# Patient Record
Sex: FEMALE | Race: BLACK OR AFRICAN AMERICAN | URBAN - METROPOLITAN AREA
[De-identification: names, ages, dates, MRNs, and addresses within clinical notes are randomized per-mention and may not be internally consistent; named-entity substitution may affect disease eponyms.]

---

## 2022-10-17 PROBLEM — R87.619 ABNORMAL PAP SMEAR OF CERVIX: Status: ACTIVE | Noted: 2021-05-01

## 2022-11-02 ENCOUNTER — OFFICE VISIT (OUTPATIENT)
Dept: OBGYN CLINIC | Age: 32
End: 2022-11-02
Payer: SUBSIDIZED

## 2022-11-02 ENCOUNTER — HOSPITAL ENCOUNTER (OUTPATIENT)
Dept: LAB | Age: 32
Discharge: HOME | End: 2022-11-02

## 2022-11-02 VITALS
HEIGHT: 66 IN | BODY MASS INDEX: 45.16 KG/M2 | SYSTOLIC BLOOD PRESSURE: 130 MMHG | WEIGHT: 281 LBS | DIASTOLIC BLOOD PRESSURE: 82 MMHG

## 2022-11-02 DIAGNOSIS — R87.610 ATYPICAL SQUAMOUS CELLS OF UNDETERMINED SIGNIFICANCE (ASCUS) ON PAPANICOLAOU SMEAR OF CERVIX: Primary | ICD-10-CM

## 2022-11-02 LAB
HCG URINE, QL. (POC): NEGATIVE
VALID INTERNAL CONTROL?: YES

## 2022-11-02 PROCEDURE — 81025 URINE PREGNANCY TEST: CPT | Performed by: OBSTETRICS & GYNECOLOGY

## 2022-11-02 PROCEDURE — 57454 BX/CURETT OF CERVIX W/SCOPE: CPT | Performed by: OBSTETRICS & GYNECOLOGY

## 2022-11-02 PROCEDURE — 99203 OFFICE O/P NEW LOW 30 MIN: CPT | Performed by: OBSTETRICS & GYNECOLOGY

## 2022-11-02 NOTE — PROGRESS NOTES
Abnormal Pap Problem Visit    Evan Perez is a 34 y.o.  presenting for problem visit for discussion and management of an abnormal pap test.  Patient had an LGSIL pap in 2021 and then had an ASCUS HPV negative pap 2022. She was referred today for evaluation given history of multiple abnormal paps. She reports no prior colposcopies or procedures on her cervix. She has not abnormal bleeding. Ob/Gyn Hx:    - 2 post dates c-sections  LMP- 10/22/2022  Menses- regular  Contraception-none  SA- yes- female partner.       Past Medical History:   Diagnosis Date    Abnormal Pap smear of cervix 2021    LSIL    Abnormal Pap smear of cervix 2022    ASCUS, HPV neg    Low iron        Past Surgical History:   Procedure Laterality Date    HX  SECTION      x2    HX CHOLECYSTECTOMY         Family History   Problem Relation Age of Onset    Hypertension Mother     Diabetes Mother        Social History     Socioeconomic History    Marital status: SINGLE     Spouse name: Not on file    Number of children: Not on file    Years of education: Not on file    Highest education level: Not on file   Occupational History    Not on file   Tobacco Use    Smoking status: Never    Smokeless tobacco: Never   Vaping Use    Vaping Use: Never used   Substance and Sexual Activity    Alcohol use: Yes    Drug use: Yes     Types: Marijuana    Sexual activity: Yes     Partners: Female   Other Topics Concern    Not on file   Social History Narrative    Not on file     Social Determinants of Health     Financial Resource Strain: Not on file   Food Insecurity: Not on file   Transportation Needs: Not on file   Physical Activity: Not on file   Stress: Not on file   Social Connections: Not on file   Intimate Partner Violence: Not on file   Housing Stability: Not on file       Not on File    Review of Systems - History obtained from the patient  Constitutional: negative for weight loss, fever, night sweats  HEENT: negative for hearing loss, earache, congestion, snoring, sorethroat  CV: negative for chest pain, palpitations, edema  Resp: negative for cough, shortness of breath, wheezing  GI: negative for change in bowel habits, abdominal pain, black or bloody stools  : negative for frequency, dysuria, hematuria, vaginal discharge  MSK: negative for back pain, joint pain, muscle pain  Breast: negative for breast lumps, nipple discharge, galactorrhea  Skin :negative for itching, rash, hives  Neuro: negative for dizziness, headache, confusion, weakness  Psych: negative for anxiety, depression, change in mood  Heme/lymph: negative for bleeding, bruising, pallor    Physical Exam    Visit Vitals  /82 (BP 1 Location: Right arm, BP Patient Position: Sitting)   Ht 5' 6\" (1.676 m)   Wt 281 lb (127.5 kg)   LMP 10/22/2022 (Exact Date)   BMI 45.35 kg/m²         OBGyn Exam      Constitutional  Appearance: well-nourished, well developed, alert, in no acute distress    HENT  Head and Face: appears normal    Neck  Inspection/Palpation: normal appearance, no masses or tenderness  Thyroid: gland size normal, nontender    Chest  Respiratory Effort: non-labored breathing    Cardiovascular  Extremities: no peripheral edema    Gastrointestinal  Abdominal Examination: abdomen non-distended, non-tender to palpation, no masses present  Liver and spleen: no hepatomegaly present, spleen not palpable  Hernias: no hernias identified    Genitourinary  See below for procedure note    Skin  General Inspection: no rash, no lesions identified    Neurologic/Psychiatric  Mental Status:  Orientation: grossly oriented to person, place and time  Mood and Affect: mood normal, affect appropriate      Assessment/Plan:    1. Atypical squamous cells of undetermined significance (ASCUS) on Papanicolaou smear of cervix  - UPT neg  - Reviewed outside records, per outside records she had a pap in 2021 that was LSIL and pap in 2022 that was ASCUS, HPV neg.  Given 2 abnormal pap tests in a row reviewed indications for colposcopy  - COLPOSCOPY,CERVIX W/ADJ VAGINA, CURETTAG; Future  - SURGICAL PATHOLOGY    Discussed her pap results and the recommendation for colposcopy. We discussed the colposcopy procedure. We discussed HPV and the pathogenesis of cervical dysplasia. Discussed the possible biopsy results and the management plan pending the results of her colposcopic biopsies - including follow-up cotesting or LEEP procedure. Will call with pathology results. Konrad Britt MD      Procedure Note: Colposcopy    Jake Edwards is a No obstetric history on file. ,  34 y.o. female She presents for colposcopy for evaluation of a cervical abnormality with a pap smear abnormality consisting of ASCUS, HPV neg, previously LSIL, HPV positive. After being presented with the risks, benefits and alternatives has she signed a consent for the procedure. She states that she understands the need for the procedure and has no further questions. She was informed that she may experience discomfort. Procedure:  She was positioned in the dorsal lithotomy position and a speculum was inserted into the vagina. Dilute acetic acid was applied to the cervix. The colposcope was used to visualize the cervix. Procedure: The transformation zone was completely visualized. Findings: This colposcopy was satisfactory. The procedure was not notable for white epithelium on the cervix. A biopsy was taken from the cervix at 9 oclock. Monsels was applied to the cervix. Endocervical currettage: An endocervical curettage was performed. Post Procedure Status: The patient tolerated the procedure well with minimal discomfort. She was observed for 10 minutes and released in good condition.     Konrad Britt MD